# Patient Record
Sex: FEMALE | URBAN - METROPOLITAN AREA
[De-identification: names, ages, dates, MRNs, and addresses within clinical notes are randomized per-mention and may not be internally consistent; named-entity substitution may affect disease eponyms.]

---

## 2021-05-06 ENCOUNTER — OFFICE VISIT (OUTPATIENT)
Dept: URBAN - METROPOLITAN AREA CLINIC 23 | Facility: CLINIC | Age: 71
End: 2021-05-06
Payer: MEDICARE

## 2021-05-06 DIAGNOSIS — H35.3221 EXUDATIVE AGE-RELATED MACULAR DEGENERATION, LEFT EYE, WITH ACTIVE CHOROIDAL NEOVASCULARIZATION: ICD-10-CM

## 2021-05-06 DIAGNOSIS — H25.13 AGE-RELATED NUCLEAR CATARACT, BILATERAL: ICD-10-CM

## 2021-05-06 DIAGNOSIS — H35.3213 EXUDATIVE AGE-RELATED MACULAR DEGENERATION, RIGHT EYE, WITH INACTIVE SCAR: Primary | ICD-10-CM

## 2021-05-06 PROCEDURE — 99204 OFFICE O/P NEW MOD 45 MIN: CPT | Performed by: OPTOMETRIST

## 2021-05-06 ASSESSMENT — KERATOMETRY
OS: 45.63
OD: 44.75

## 2021-05-06 ASSESSMENT — INTRAOCULAR PRESSURE
OD: 15
OS: 15

## 2021-05-06 NOTE — IMPRESSION/PLAN
Impression: Exudative age-related macular degeneration, right eye, with inactive scar: H35.9969. Plan: Discussed findings. Recommend low vision services.

## 2021-05-06 NOTE — IMPRESSION/PLAN
Impression: Age-related nuclear cataract, bilateral: H25.13. Plan: Discussed macular pathology is causing majority of vision changes. Cataract surgery not recommended at this time.

## 2021-05-06 NOTE — IMPRESSION/PLAN
Impression: Exudative age-related macular degeneration, left eye, with active choroidal neovascularization: H38.0697.  Plan: Recommend consult with Dr. Katiuska Bryan

## 2021-05-26 ENCOUNTER — OFFICE VISIT (OUTPATIENT)
Dept: URBAN - METROPOLITAN AREA CLINIC 23 | Facility: CLINIC | Age: 71
End: 2021-05-26
Payer: MEDICARE

## 2021-05-26 DIAGNOSIS — H35.3233 BILATERAL EXUDATIVE AGE-RELATED MACULAR DEGENERATION W/ INACTIVE SCAR: Primary | ICD-10-CM

## 2021-05-26 PROCEDURE — 99204 OFFICE O/P NEW MOD 45 MIN: CPT | Performed by: OPHTHALMOLOGY

## 2021-05-26 PROCEDURE — 92134 CPTRZ OPH DX IMG PST SGM RTA: CPT | Performed by: OPHTHALMOLOGY

## 2021-05-26 ASSESSMENT — INTRAOCULAR PRESSURE
OS: 11
OD: 9

## 2021-05-26 NOTE — IMPRESSION/PLAN
Impression: Bilateral exudative age-related macular degeneration w/ inactive scar: O94.8069. Bilateral. Condition: stable, poor prognosis. Vision: vision affected. Hx of EPI tx OS in 2016 elsewhere Plan: Discussed diagnosis in detail with patient. Exam OD shows extensive inactive appearing disciform scar and Exam OS shows marginal hemorrhage, exudates, and disciform scar. OCT and Optos OU confirm findings of the exam. No treatment is required at this time. Advised patient that due to the severity of the scarring, EPI tx is unlikely to improve the vision. Use of vitamins has shown to improve the effects of ARMD. Recommend AREDS 2 formula. Wear quality sunglasses and monitor vision at home with [facility]. Call the office for an immediate appointment if [place] worsens. Educational material provided to patient. Recommend patient schedule [facility] w/ Dr Chapito Huerta for assistance with visual aids/devices.